# Patient Record
(demographics unavailable — no encounter records)

---

## 2025-03-17 NOTE — HISTORY OF PRESENT ILLNESS
[de-identified] : 13 year old male with abdominal pain, heartburn and chest pain. One year ago in 2024 had an episode where woke vomiting in the middle of the night. Then after that had some anxiety related to vomiting with fear of another episode.  Then over the summer when at camp and not as mindful about food choices developed symptoms. 3-4 weeks ago returned from [place]. While there had vomiting episodes Then developed fever. Then developed nausea once again and increased stool frequency. Now with nausea and heartburn. No food sticking. Feels some of symptoms related to anxiety.  TUMs with relief.  BMs 1-2x/d, [place] 2 to 4.  Labs from Dec 2024 reviewed and unremarkable Prior screening not positive for any of the HLA DQ 2/DQ8 risk alleles.  Celiac Disease risk from the HLA DQA/DQB genotype that was identified is approximately 1:2518 (<0.04%). Seen by GI (Dr. Ceron) for constipation at 2 years of age which began at approx 6 months of age and for hematemesis with duodenal ulcer as a  Family Hx: father with celiac dz and H pylori; mother with CHLOÉ

## 2025-03-17 NOTE — CONSULT LETTER
[Dear  ___] : Dear  [unfilled], [Consult Letter:] : I had the pleasure of evaluating your patient, [unfilled]. [Please see my note below.] : Please see my note below. [Consult Closing:] : Thank you very much for allowing me to participate in the care of this patient.  If you have any questions, please do not hesitate to contact me. [Sincerely,] : Sincerely, [FreeTextEntry3] : Jurgen Ceron MD Division of Pediatric Gastroenterology [facility]

## 2025-03-17 NOTE — PHYSICAL EXAM
[Well Developed] : well developed [NAD] : in no acute distress [PERRL] : pupils were equal, round, reactive to light  [Moist & Pink Mucous Membranes] : moist and pink mucous membranes [CTAB] : lungs clear to auscultation bilaterally [Regular Rate and Rhythm] : regular rate and rhythm [Normal S1, S2] : normal S1 and S2 [Soft] : soft  [Normal Bowel Sounds] : normal bowel sounds [No HSM] : no hepatosplenomegaly appreciated [Normal Tone] : normal tone [Well-Perfused] : well-perfused [Interactive] : interactive [icteric] : anicteric [Respiratory Distress] : no respiratory distress  [Distended] : non distended [Tender] : non tender [Normal rectal exam] : exam was normal [Charlie Stage ___] : Charlie stage [unfilled] [Edema] : no edema [Cyanosis] : no cyanosis [Rash] : no rash [Jaundice] : no jaundice [FreeTextEntry1] : with braces

## 2025-03-17 NOTE — ASSESSMENT
[FreeTextEntry1] : Intermittent episodes of abdominal pain with nausea, chest pain and heartburn over the past few years that has increased over the past year with unremarkable lab assessment at PMD. 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 DGBIs are typically a diagnosis of exclusion in individuals meeting symptomatic criteria. No current evidence for an inflammatory autoimmune process including celiac disease. Other potential etiologies include gastroesophageal reflux disease and peptic disease including possible infectious etiologies. Other potential etiologies include but are not limited to lactose intolerance or other malabsorptive process including sugar alcohols as well as small intestinal bacterial overgrowth.   Plan: stool eval for H pylori if neg, trial probiotic if normal and symptoms persist, stool for calprotectin consider further assessment with lactose breath test pending clinical status and above results also consider EGD with disaccharidase

## 2025-06-09 NOTE — ASSESSMENT
[FreeTextEntry1] : 13 year old male with resolution of heartburn and chest pain following a course of famotidine as well as adherence to a lactose free diet.  Presumptive lactose intolerance with resolution of abdominal pain and diarrhea on a lactose free diet using lactaid products and lactaid pills. Discussed that given resolution of abdominal pain on therapy for lactose malabsorption with onset of symptoms of abdominal pain and diarrhea when ingesting lactose, probably no clinical utility to performing a lactose breath test at this time.   Plan: lactose free diet - resources provided hold off on lactose breath test at this time CHLOÉ precautions if symptoms of abdominal pain, heartburn and chest pain recur and persist would assess further family expressed understanding and was in agreement with plan

## 2025-06-09 NOTE — CONSULT LETTER
[Consult Letter:] : I had the pleasure of evaluating your patient, [unfilled]. [Dear  ___] : Dear  [unfilled], [Please see my note below.] : Please see my note below. [Consult Closing:] : Thank you very much for allowing me to participate in the care of this patient.  If you have any questions, please do not hesitate to contact me. [Sincerely,] : Sincerely, [FreeTextEntry3] : Jurgen Ceron MD Division of Pediatric Gastroenterology [facility]

## 2025-06-09 NOTE — PHYSICAL EXAM
[Well Developed] : well developed [NAD] : in no acute distress [Thin] : thin [PERRL] : pupils were equal, round, reactive to light  [icteric] : anicteric [Moist & Pink Mucous Membranes] : moist and pink mucous membranes [CTAB] : lungs clear to auscultation bilaterally [Respiratory Distress] : no respiratory distress  [Regular Rate and Rhythm] : regular rate and rhythm [Normal S1, S2] : normal S1 and S2 [Soft] : soft  [Distended] : non distended [Tender] : non tender [Normal Bowel Sounds] : normal bowel sounds [No HSM] : no hepatosplenomegaly appreciated [Well-Perfused] : well-perfused [Normal Tone] : normal tone [Edema] : no edema [Rash] : no rash [Cyanosis] : no cyanosis [Jaundice] : no jaundice [Interactive] : interactive [FreeTextEntry1] : with braces

## 2025-06-09 NOTE — HISTORY OF PRESENT ILLNESS
[de-identified] : 13 year old male with abdominal pain, nausea, chest pain and heartburn returns for follow up. Since last visit was on Pepcid with resolution of symptoms and now off for almost 2 weeks. No further symptoms. Also has changed his diet. Drinking lactaid milk and using lactose free cheese and using lactaid pills with dairy with resolution of symptoms.  Had severe symptoms after eating a croissant and another time after having milk in cereal.  BMs [place] 3 or 4 once a day or every other day.  Labs from Dec 2024 reviewed and unremarkable Prior screening not positive for any of the HLA DQ 2/DQ8 risk alleles. Celiac Disease risk from the HLA DQA/DQB genotype that was identified is approximately 1:2518 (<0.04%). Seen by GI (Dr. Ceron) for constipation at 2 years of age which began at approx 6 months of age and for hematemesis with duodenal ulcer as a  Family Hx: father with celiac dz and H pylori; mother with CHLOÉ. Mother with presumptive lactose intolerance